# Patient Record
Sex: FEMALE | Race: WHITE | URBAN - METROPOLITAN AREA
[De-identification: names, ages, dates, MRNs, and addresses within clinical notes are randomized per-mention and may not be internally consistent; named-entity substitution may affect disease eponyms.]

---

## 2024-04-29 ENCOUNTER — INITIAL CONSULT (OUTPATIENT)
Age: 34
End: 2024-04-29

## 2024-04-29 DIAGNOSIS — Z13.71 ENCOUNTER FOR NONPROCREATIVE GENETIC COUNSELING AND TESTING: Primary | ICD-10-CM

## 2024-04-29 DIAGNOSIS — Z71.83 ENCOUNTER FOR NONPROCREATIVE GENETIC COUNSELING AND TESTING: Primary | ICD-10-CM

## 2024-04-29 NOTE — PROGRESS NOTES
[facility]  A Part of [facility]  Genetic Counseling   [address]  [place] [address]  Phone: [phone]  Fax: [phone]    Date of Visit: 4/29/2024  Patient Name: Chana Martinez  YOB: 1990  Referring Provider: Tana Bender MD       HISTORY OF PRESENT ILLNESS  Chana Martinez is a 34 y.o. female with no personal cancer history referred by Dr. Bender for genetic counseling due to her family history of cancer. We met to review this history and determine whether genetic testing for hereditary susceptibility to disease may be indicated for the patient.    The appointment was conducted in person and Ms. Martinez was unaccompanied to the appointment. .    Medical History  Complex nodules on thyroid, follows with endocrinology, imaging every 2 years    Cancer History:  none    Gynecologic History:  Onset of menses at age 11y. 3 children. First completed pregnancy at age 26y. OCPs used for approximately 4 years.  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   Smoking status: never  Alcohol intake: 1 drink/week    Family History  A three-generation pedigree was collected at the time of the appointment based on patient report in the absence of complete medical records. A full pedigree is available in the electronic medical record for additional details.  No consanguinity reported in either maternal or paternal family.  No Ashkenazi Judaism ancestry reported.     Children:  Two daughters, 7 & 4, no cancer history  Son, 3, no cancer history  Siblings:  Three sisters, 33, 28, & 20, no cancer history  Three

## 2024-05-09 ENCOUNTER — TELEPHONE (OUTPATIENT)
Age: 34
End: 2024-05-09

## 2024-05-09 NOTE — TELEPHONE ENCOUNTER
Left voicemail message advising that [employer] has not received the patient's sample. Requested call back at [phone].

## 2024-05-17 ENCOUNTER — TELEPHONE (OUTPATIENT)
Age: 34
End: 2024-05-17

## 2024-05-17 NOTE — TELEPHONE ENCOUNTER
Left voicemail message advising that [facility] has not received the patient's sample. Requested call back at [phone].

## 2024-10-04 ENCOUNTER — TELEPHONE (OUTPATIENT)
Age: 34
End: 2024-10-04

## 2024-10-04 NOTE — TELEPHONE ENCOUNTER
Initial genetic consult on 4/29/2024 referred by Tana Bender MD .  Germline genetic testing order canceled on 10/4/2024 due to: patient has not submitted sample for germline genetic testing after multiple contact attempts.